# Patient Record
Sex: FEMALE | HISPANIC OR LATINO | URBAN - METROPOLITAN AREA
[De-identification: names, ages, dates, MRNs, and addresses within clinical notes are randomized per-mention and may not be internally consistent; named-entity substitution may affect disease eponyms.]

---

## 2018-11-14 ENCOUNTER — OFFICE VISIT (OUTPATIENT)
Dept: URBAN - METROPOLITAN AREA CLINIC 48 | Facility: CLINIC | Age: 73
End: 2018-11-14
Payer: COMMERCIAL

## 2018-11-14 PROCEDURE — 92014 COMPRE OPH EXAM EST PT 1/>: CPT | Performed by: OPHTHALMOLOGY

## 2018-11-14 ASSESSMENT — KERATOMETRY
OD: 41.38
OS: 41.00

## 2018-11-14 ASSESSMENT — INTRAOCULAR PRESSURE
OD: 14
OS: 15

## 2018-11-14 NOTE — IMPRESSION/PLAN
Impression: Age-related nuclear cataract, bilateral: H25.13. Plan: Not visually significant to patient Will monitor RTC 1 year cat eval

## 2020-07-16 ENCOUNTER — OFFICE VISIT (OUTPATIENT)
Dept: URBAN - METROPOLITAN AREA CLINIC 48 | Facility: CLINIC | Age: 75
End: 2020-07-16
Payer: COMMERCIAL

## 2020-07-16 PROCEDURE — 92014 COMPRE OPH EXAM EST PT 1/>: CPT | Performed by: OPHTHALMOLOGY

## 2020-07-16 ASSESSMENT — INTRAOCULAR PRESSURE
OS: 13
OD: 15

## 2020-07-16 ASSESSMENT — KERATOMETRY
OS: 41.13
OD: 41.50

## 2020-07-16 ASSESSMENT — VISUAL ACUITY
OS: 20/20
OD: 20/20

## 2021-10-11 ENCOUNTER — OFFICE VISIT (OUTPATIENT)
Dept: URBAN - METROPOLITAN AREA CLINIC 48 | Facility: CLINIC | Age: 76
End: 2021-10-11
Payer: COMMERCIAL

## 2021-10-11 DIAGNOSIS — H25.13 AGE-RELATED NUCLEAR CATARACT, BILATERAL: Primary | ICD-10-CM

## 2021-10-11 PROCEDURE — 99214 OFFICE O/P EST MOD 30 MIN: CPT | Performed by: OPHTHALMOLOGY

## 2021-10-11 ASSESSMENT — KERATOMETRY
OS: 41.25
OD: 41.50

## 2021-10-11 ASSESSMENT — INTRAOCULAR PRESSURE
OD: 10
OS: 12

## 2021-10-11 NOTE — IMPRESSION/PLAN
Impression: Age-related nuclear cataract, bilateral: H25.13. Plan: Discussed and reviewed diagnosis with patient, understood by patient. Cataract is not visually significant to patient, will continue to monitor. Patient elects to get new refraction. 

RTC PRN

## 2024-08-22 ENCOUNTER — OFFICE VISIT (OUTPATIENT)
Dept: URBAN - METROPOLITAN AREA CLINIC 48 | Facility: CLINIC | Age: 79
End: 2024-08-22
Payer: COMMERCIAL

## 2024-08-22 DIAGNOSIS — H25.13 AGE-RELATED NUCLEAR CATARACT, BILATERAL: Primary | ICD-10-CM

## 2024-08-22 PROCEDURE — 92020 GONIOSCOPY: CPT | Performed by: OPHTHALMOLOGY

## 2024-08-22 PROCEDURE — 99214 OFFICE O/P EST MOD 30 MIN: CPT | Performed by: OPHTHALMOLOGY

## 2024-08-22 ASSESSMENT — KERATOMETRY
OD: 41.50
OS: 41.50

## 2024-08-22 ASSESSMENT — VISUAL ACUITY
OD: 20/25
OS: 20/25

## 2024-08-22 ASSESSMENT — INTRAOCULAR PRESSURE
OS: 14
OD: 12

## 2025-05-06 ENCOUNTER — OFFICE VISIT (OUTPATIENT)
Dept: URBAN - METROPOLITAN AREA CLINIC 48 | Facility: CLINIC | Age: 80
End: 2025-05-06
Payer: COMMERCIAL

## 2025-05-06 DIAGNOSIS — H25.13 AGE-RELATED NUCLEAR CATARACT, BILATERAL: Primary | ICD-10-CM

## 2025-05-06 DIAGNOSIS — H04.123 DRY EYE SYNDROME OF BILATERAL LACRIMAL GLANDS: ICD-10-CM

## 2025-05-06 PROCEDURE — 99214 OFFICE O/P EST MOD 30 MIN: CPT | Performed by: OPHTHALMOLOGY

## 2025-05-06 ASSESSMENT — VISUAL ACUITY
OS: 20/25
OD: 20/25

## 2025-05-06 ASSESSMENT — INTRAOCULAR PRESSURE
OD: 12
OS: 12

## 2025-05-06 ASSESSMENT — KERATOMETRY
OD: 40.88
OS: 40.88